# Patient Record
Sex: MALE | Race: AMERICAN INDIAN OR ALASKA NATIVE
[De-identification: names, ages, dates, MRNs, and addresses within clinical notes are randomized per-mention and may not be internally consistent; named-entity substitution may affect disease eponyms.]

---

## 2018-07-23 ENCOUNTER — HOSPITAL ENCOUNTER (EMERGENCY)
Dept: HOSPITAL 5 - ED | Age: 38
Discharge: HOME | End: 2018-07-23
Payer: SELF-PAY

## 2018-07-23 VITALS — SYSTOLIC BLOOD PRESSURE: 142 MMHG | DIASTOLIC BLOOD PRESSURE: 95 MMHG
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
## 2018-07-23 DIAGNOSIS — T63.481A: Primary | ICD-10-CM
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
## 2018-07-23 DIAGNOSIS — F17.200: ICD-10-CM

## 2018-07-23 PROCEDURE — 99282 EMERGENCY DEPT VISIT SF MDM: CPT

## 2018-07-23 NOTE — EMERGENCY DEPARTMENT REPORT
ED Medical Clearance HPI





- General


Chief complaint: Medical Clearance


Stated complaint: BEE STING YELLOW JACKET/ALLERGIC


Time Seen by Provider: 18 19:18


Source: patient


Mode of arrival: Ambulatory





- History of Present Illness


Initial comments: 


39 y/o male s/p insect sting left ankle 6 hrs ago was complaining of itching 

burning irritation no sob no wheezing no fever no sob no palpitations open no 

open wound. 





Onset/Timin


-: Sudden, month(s)


Reason for Medical Clearance: other (insect sting )


Place: home


Alledged Intoxication: No


Compliant with Home Medications: No


Traumatic Symptoms: denies traumatic injury


Associated Symptoms: denies: chest pain, shortness of breath, palpitations, 

diaphoresis, denies other symptoms, confusion, cough, fever/chills, headaches, 

anorexia, malaise, nausea/vomiting, rash, seizure, syncope, weakness


Treatments Prior to Arrival: none


Home medications: 


 Previous Rx's











 Medication  Instructions  Recorded  Last Taken  Type


 


Diphenhydramine HCl [Benadryl GEL] 1 applic TP TID PRN #1 bottle 18 

Unknown Rx


 


Metoclopramide [Reglan] 10 mg PO TID #21 tab 18 Unknown Rx


 


diphenhydrAMINE [Benadryl CAP] 25 mg PO Q8HR PRN #21 capsule 18 Unknown Rx


 


predniSONE [Deltasone] 40 mg PO QDAY #10 tab 18 Unknown Rx











Allergies/Adverse reactions: 


 Allergies











Allergy/AdvReac Type Severity Reaction Status Date / Time


 


No Known Allergies Allergy   Unverified 18 14:32














ED Review of Systems


ROS: 


Stated complaint: BEE STING YELLOW JACKET/ALLERGIC


Other details as noted in HPI





Constitutional: denies: chills, fever


Eyes: denies: eye pain, eye discharge, vision change


ENT: denies: ear pain, throat pain


Respiratory: denies: cough, shortness of breath, wheezing


Cardiovascular: denies: chest pain, palpitations


Endocrine: no symptoms reported


Gastrointestinal: denies: abdominal pain, nausea, diarrhea


Genitourinary: denies: urgency, dysuria


Musculoskeletal: denies: back pain, joint swelling, arthralgia


Skin: pruritus, other (mild swelling no erythema mild swelling no open wound no 

drainage ).  denies: rash, lesions, change in color, change in hair/nails


Neurological: denies: headache, weakness, paresthesias


Psychiatric: as per HPI


Hematological/Lymphatic: denies: easy bleeding, easy bruising





ED Past Medical Hx





- Past Medical History


Previous Medical History?: No





- Surgical History


Past Surgical History?: No





- Social History


Smoking Status: Current Every Day Smoker


Substance Use Type: None





- Medications


Home Medications: 


 Home Medications











 Medication  Instructions  Recorded  Confirmed  Last Taken  Type


 


Diphenhydramine HCl [Benadryl GEL] 1 applic TP TID PRN #1 bottle 18  

Unknown Rx


 


Metoclopramide [Reglan] 10 mg PO TID #21 tab 18  Unknown Rx


 


diphenhydrAMINE [Benadryl CAP] 25 mg PO Q8HR PRN #21 capsule 18  Unknown 

Rx


 


predniSONE [Deltasone] 40 mg PO QDAY #10 tab 18  Unknown Rx














ED Physical Exam





- General


Limitations: No Limitations


General appearance: alert, in no apparent distress





- Head


Head exam: Present: atraumatic, normocephalic





- Eye


Eye exam: Present: normal appearance





- ENT


ENT exam: Present: mucous membranes moist





- Neck


Neck exam: Present: normal inspection





- Respiratory


Respiratory exam: Present: normal lung sounds bilaterally.  Absent: respiratory 

distress, wheezes, rales, rhonchi, stridor, chest wall tenderness, accessory 

muscle use, decreased breath sounds, prolonged expiratory





- Cardiovascular


Cardiovascular Exam: Present: regular rate, normal rhythm.  Absent: systolic 

murmur, diastolic murmur, rubs, gallop





- GI/Abdominal


GI/Abdominal exam: Present: soft, normal bowel sounds.  Absent: distended, 

tenderness, guarding, rebound, rigid, organomegaly, mass, bruit, pulsatile mass





- Rectal


Rectal exam: Present: deferred





- Extremities Exam


Extremities exam: Present: normal inspection





- Back Exam


Back exam: Present: normal inspection





- Neurological Exam


Neurological exam: Present: alert, oriented X3, CN II-XII intact, normal gait, 

reflexes normal





- Psychiatric


Psychiatric exam: Present: normal affect, normal mood





- Skin


Skin exam: Present: warm, dry, intact, normal color, urticaria (left lateral 

ankle mild swelling pruritis no erythema no drainage ).  Absent: rash, cyanosis

, diaphoretic, erythema, vesicles, petechiae, pallor, abrasion, ecchymosis





ED Course


 Vital Signs











  18





  14:29


 


Temperature 98.6 F


 


Pulse Rate 63


 


Respiratory 16





Rate 


 


Blood Pressure 142/95


 


O2 Sat by Pulse 99





Oximetry 














ED Medical Decision Making





- Medical Decision Making


This is simple insect sting there are no symptoms of anaphylaxis plan by mouth 

prednisone and Benadryl Reglan followed PCP N2 to 3 days patient verbalizes 

understanding and signed will be DC'd home in stable condition at this time. 








ED Disposition


Clinical Impression: 


Insect sting


Qualifiers:


 Encounter type: initial encounter Injury intent: accidental or unintentional 

Qualified Code(s): T63.481A - Toxic effect of venom of other arthropod, 

accidental (unintentional), initial encounter





Disposition: DC-01 TO HOME OR SELFCARE


Is pt being admited?: No


Does the pt Need Aspirin: No


Condition: Good


Instructions:  Insect Bite or Sting (ED)


Prescriptions: 


diphenhydrAMINE [Benadryl CAP] 25 mg PO Q8HR PRN #21 capsule


 PRN Reason: Itching


Diphenhydramine HCl [Benadryl GEL] 1 applic TP TID PRN #1 bottle


 PRN Reason: Itching


Metoclopramide [Reglan] 10 mg PO TID #21 tab


predniSONE [Deltasone] 40 mg PO QDAY #10 tab


Referrals: 


[facility] [Outside] - 3-5 Days


Forms:  Work/School Release Form(ED)


Time of Disposition: 20:10